# Patient Record
Sex: FEMALE | Race: WHITE | NOT HISPANIC OR LATINO | Employment: OTHER | URBAN - METROPOLITAN AREA
[De-identification: names, ages, dates, MRNs, and addresses within clinical notes are randomized per-mention and may not be internally consistent; named-entity substitution may affect disease eponyms.]

---

## 2021-11-12 ENCOUNTER — PREPPED CHART (OUTPATIENT)
Dept: URBAN - METROPOLITAN AREA CLINIC 19 | Facility: CLINIC | Age: 61
End: 2021-11-12

## 2021-11-12 NOTE — PATIENT DISCUSSION
Discontinue the following treatment(s): PROLENSA. Recommend the following Over-The-Counter treatment(s): REFRESH OR SYSTANE OVER THE COUNTER. Other Instructions: DO NOT USE VISINE OR ANY DROPS TO GET RID OF REDNESS.

## 2022-02-16 ASSESSMENT — VISUAL ACUITY
OS_SC: 20/25+3
OD_SC: 20/25+3
OU_SC: J1+
OU_SC: 20/25

## 2022-02-16 ASSESSMENT — TONOMETRY
OD_IOP_MMHG: 15
OS_IOP_MMHG: 21

## 2022-02-18 ENCOUNTER — CONSULTATION/EVALUATION (OUTPATIENT)
Dept: URBAN - METROPOLITAN AREA CLINIC 19 | Facility: CLINIC | Age: 62
End: 2022-02-18

## 2022-02-18 DIAGNOSIS — H16.223: ICD-10-CM

## 2022-02-18 PROCEDURE — 92014 COMPRE OPH EXAM EST PT 1/>: CPT

## 2022-02-18 ASSESSMENT — VISUAL ACUITY
OD_SC: 20/25-2
OS_SC: 20/25
OU_SC: 20/20-1

## 2022-02-18 ASSESSMENT — TONOMETRY
OD_IOP_MMHG: 18
OS_IOP_MMHG: 19